# Patient Record
Sex: FEMALE | Race: BLACK OR AFRICAN AMERICAN | Employment: FULL TIME | URBAN - METROPOLITAN AREA
[De-identification: names, ages, dates, MRNs, and addresses within clinical notes are randomized per-mention and may not be internally consistent; named-entity substitution may affect disease eponyms.]

---

## 2019-06-03 ENCOUNTER — HOSPITAL ENCOUNTER (EMERGENCY)
Facility: CLINIC | Age: 59
Discharge: HOME | End: 2019-06-03
Attending: EMERGENCY MEDICINE | Admitting: EMERGENCY MEDICINE
Payer: COMMERCIAL

## 2019-06-03 VITALS
TEMPERATURE: 97.8 F | OXYGEN SATURATION: 99 % | WEIGHT: 197 LBS | DIASTOLIC BLOOD PRESSURE: 89 MMHG | HEIGHT: 67 IN | HEART RATE: 69 BPM | BODY MASS INDEX: 30.92 KG/M2 | RESPIRATION RATE: 12 BRPM | SYSTOLIC BLOOD PRESSURE: 140 MMHG

## 2019-06-03 DIAGNOSIS — K59.00 CONSTIPATION, UNSPECIFIED CONSTIPATION TYPE: ICD-10-CM

## 2019-06-03 PROCEDURE — 25000132 ZZH RX MED GY IP 250 OP 250 PS 637: Performed by: EMERGENCY MEDICINE

## 2019-06-03 PROCEDURE — 96374 THER/PROPH/DIAG INJ IV PUSH: CPT

## 2019-06-03 PROCEDURE — 96375 TX/PRO/DX INJ NEW DRUG ADDON: CPT

## 2019-06-03 PROCEDURE — 25000128 H RX IP 250 OP 636: Performed by: EMERGENCY MEDICINE

## 2019-06-03 PROCEDURE — 99285 EMERGENCY DEPT VISIT HI MDM: CPT | Mod: 25

## 2019-06-03 RX ORDER — MAGNESIUM CARB/ALUMINUM HYDROX 105-160MG
296 TABLET,CHEWABLE ORAL ONCE
Status: COMPLETED | OUTPATIENT
Start: 2019-06-03 | End: 2019-06-03

## 2019-06-03 RX ORDER — AMOXICILLIN 250 MG
1 CAPSULE ORAL 2 TIMES DAILY
Qty: 20 TABLET | Refills: 0 | Status: SHIPPED | OUTPATIENT
Start: 2019-06-03 | End: 2019-06-13

## 2019-06-03 RX ORDER — MORPHINE SULFATE 4 MG/ML
6 INJECTION, SOLUTION INTRAMUSCULAR; INTRAVENOUS
Status: COMPLETED | OUTPATIENT
Start: 2019-06-03 | End: 2019-06-03

## 2019-06-03 RX ORDER — ONDANSETRON 2 MG/ML
4 INJECTION INTRAMUSCULAR; INTRAVENOUS EVERY 30 MIN PRN
Status: DISCONTINUED | OUTPATIENT
Start: 2019-06-03 | End: 2019-06-03 | Stop reason: HOSPADM

## 2019-06-03 RX ADMIN — MAGNESIUM CITRATE 286 ML: 1.75 LIQUID ORAL at 13:29

## 2019-06-03 RX ADMIN — MORPHINE SULFATE 6 MG: 4 INJECTION INTRAVENOUS at 12:59

## 2019-06-03 RX ADMIN — ONDANSETRON 4 MG: 2 INJECTION INTRAMUSCULAR; INTRAVENOUS at 13:00

## 2019-06-03 RX ADMIN — MAGNESIUM CITRATE 296 ML: 1.75 LIQUID ORAL at 12:59

## 2019-06-03 SDOH — HEALTH STABILITY: MENTAL HEALTH: HOW OFTEN DO YOU HAVE A DRINK CONTAINING ALCOHOL?: NEVER

## 2019-06-03 ASSESSMENT — MIFFLIN-ST. JEOR: SCORE: 1511.22

## 2019-06-03 ASSESSMENT — ENCOUNTER SYMPTOMS
ANAL BLEEDING: 1
RECTAL PAIN: 1
CONSTIPATION: 1

## 2019-06-03 NOTE — ED PROVIDER NOTES
"  History     Chief Complaint:  Rectal Pain     HPI   Tracy Ardon is a 58 year old female who presents to the ED for evaluation of rectal pain. The patient states that she is constipated and has not had a bowl movement in the past three days. This morning she tried an over the counter enema and after that failed, she noticed rectal bleeding. The patient states that for the past hour, she has had severe pain and pressure in her rectum that comes and goes and feels like she is \"in labor.\" The pain is exacerbated with movement, especially if she lies on her back.     Allergies:  Ampicillin     Medications:    Medications reviewed. No current medications.     Past Medical History:    Depression and anxiety   Degenerative joint disease   Obesity   Fibromyalgia     Past Surgical History:    Tubal ligation  Hernia repair x2  Left rotator cuff repair     Family History:    Brother: GERD, hypertension, congenital disabilities   Maternal grandmother: ovarian cancer, diabetes, hypertension   Mother: ovarian cancer, cervical cancer   Sister: cervical cancer, hypertension     Social History:  The patient was accompanied to the ED by family.  Smoking Status: Former cigarette smoker, quit in 1997  Smokeless Tobacco: Never Used  Alcohol Use: Negative  Drug Use: Negative    Marital Status:       Review of Systems   Gastrointestinal: Positive for anal bleeding, constipation and rectal pain.   All other systems reviewed and are negative.      Physical Exam     Patient Vitals for the past 24 hrs:   BP Temp Temp src Pulse Heart Rate Resp SpO2 Height Weight   06/03/19 1350 140/89 -- -- 69 -- 12 -- -- --   06/03/19 1330 -- -- -- -- -- -- 99 % -- --   06/03/19 1315 -- -- -- -- -- -- 100 % -- --   06/03/19 1310 -- -- -- -- -- -- 100 % -- --   06/03/19 1230 (!) 154/94 -- -- -- -- -- -- -- --   06/03/19 1226 (!) 150/98 97.8  F (36.6  C) Oral -- 80 16 97 % 1.702 m (5' 7\") 89.4 kg (197 lb)     Physical Exam  Vitals: reviewed by " me  General: Pt seen on [facility], face down, moaning.  Nondiaphoretic, quite anxious appearing.   Eyes: Tracking well, clear conjunctiva BL  ENT: MMM, midline trachea.   Lungs:   No tachypnea, no accessory muscle use. No respiratory distress.   CV: Rate as above, regular rhythm.    Abd: Soft, non tender, no guarding, no rebound. Non distended  MSK: no peripheral edema or joint effusion.  No evidence of trauma  Skin: No rash, normal turgor and temperature  Neuro: Clear speech and no facial droop.  Psych: Not RIS, no e/o AH/VH      Emergency Department Course     Interventions:  1259 Morphine 6 mg IV   1259 Magnesium citrate 296 mL Oral   1300 Zofran 4 mg IV   1329 Pink lady enema 286 mL Rectal     Emergency Department Course:    1244 Nursing notes and vitals reviewed.    1250 I performed an exam of the patient as documented above.     1423 Rechecked and updated.      1445 I personally answered all related questions prior to discharge.    Impression & Plan      Medical Decision Making:  Tracy Ardon is a 58 year old female who presents to the emergency room with what appears to be constipation. Patient had a small amount of blood on her finger after trying to manually decompact herself, but no blood in her stool. With the amount of straining she is doing, this is likely secondary as she has felt much improved here with an enema, and has normal stool in the ER. Has a benign abdomen at the time of arrival and time of discharge, did have some degree of anxiety and pain associated with this, but will discharge home with stool softeners as again, she is doing so much better here after the enema. Patient is okay with this plan, no evidence of an emergent abdominal abnormalities. Will discharge as above.     Diagnosis:    ICD-10-CM    1. Constipation, unspecified constipation type K59.00      Disposition:   The patient is discharged to home.    Discharge Medications:  START taking      Dose / Directions    senna-docusate 8.6-50 MG tablet  Commonly known as:  SENOKOT-S/PERICOLACE      Dose:  1 tablet  Take 1 tablet by mouth 2 times daily for 10 days  Quantity:  20 tablet  Refills:  0       Scribe Disclosure:  Ofelia CALVIN, am serving as a scribe at 12:47 PM on 6/3/2019 to document services personally performed by De Hernandez MD based on my observations and the provider's statements to me.       EMERGENCY DEPARTMENT       Ubaldo Hernandez MD  06/04/19 [id number]

## 2019-06-03 NOTE — ED AVS SNAPSHOT
Emergency Department  [address] [facility] [address]  Phone:  [phone]  Fax:  [phone]                                    Tracy Ardon   MRN: [id number]    Department:   Emergency Department   Date of Visit:  6/3/2019           After Visit Summary Signature Page    I have received my discharge instructions, and my questions have been answered. I have discussed any challenges I see with this plan with the nurse or doctor.    ..........................................................................................................................................  Patient/Patient Representative Signature      ..........................................................................................................................................  Patient Representative Print Name and Relationship to Patient    ..................................................               ................................................  Date                                   Time    ..........................................................................................................................................  Reviewed by Signature/Title    ...................................................              ..............................................  Date                                               Time          22EPIC Rev 08/18

## 2022-04-26 ENCOUNTER — APPOINTMENT (RX ONLY)
Dept: URBAN - METROPOLITAN AREA CLINIC 83 | Facility: CLINIC | Age: 62
Setting detail: DERMATOLOGY
End: 2022-04-26

## 2022-04-26 DIAGNOSIS — L65.0 TELOGEN EFFLUVIUM: ICD-10-CM

## 2022-04-26 DIAGNOSIS — L21.8 OTHER SEBORRHEIC DERMATITIS: ICD-10-CM

## 2022-04-26 DIAGNOSIS — L67.8 OTHER HAIR COLOR AND HAIR SHAFT ABNORMALITIES: ICD-10-CM

## 2022-04-26 PROCEDURE — 99203 OFFICE O/P NEW LOW 30 MIN: CPT

## 2022-04-26 PROCEDURE — ? PRESCRIPTION

## 2022-04-26 PROCEDURE — ? TREATMENT REGIMEN

## 2022-04-26 PROCEDURE — ? COUNSELING

## 2022-04-26 RX ORDER — CLOBETASOL PROPIONATE 0.5 MG/ML
FEW SOLUTION TOPICAL HS
Qty: 50 | Refills: 5 | Status: ERX | COMMUNITY
Start: 2022-04-26

## 2022-04-26 RX ADMIN — CLOBETASOL PROPIONATE FEW: 0.5 SOLUTION TOPICAL at 00:00

## 2022-04-26 NOTE — HPI: HAIR LOSS
Previous Labs: Yes
How Did The Hair Loss Occur?: gradual in onset
How Severe Is Your Hair Loss?: mild
Additional History: Hair loss and patches on scalp. Patient’s hair is not growing as fast as before . Patient noticed in 2020.
When Were The Labs Drawn? (Drawn...): 2/22
Lab Details: Normal

## 2022-04-26 NOTE — PROCEDURE: MIPS QUALITY
Quality 110: Preventive Care And Screening: Influenza Immunization: Influenza Immunization not Administered for Documented Reasons.
Additional Notes: Patient refused.
Detail Level: Detailed
Quality 130: Documentation Of Current Medications In The Medical Record: Current Medications Documented